# Patient Record
Sex: MALE | Race: BLACK OR AFRICAN AMERICAN | NOT HISPANIC OR LATINO | Employment: OTHER | URBAN - METROPOLITAN AREA
[De-identification: names, ages, dates, MRNs, and addresses within clinical notes are randomized per-mention and may not be internally consistent; named-entity substitution may affect disease eponyms.]

---

## 2024-06-20 PROBLEM — F79 INTELLECTUAL DISABILITY: Status: ACTIVE | Noted: 2024-06-20

## 2024-06-20 PROBLEM — H90.0 CONDUCTIVE HEARING LOSS, BILATERAL: Status: ACTIVE | Noted: 2024-06-20

## 2024-06-20 PROBLEM — Z86.16 HISTORY OF COVID-19: Status: ACTIVE | Noted: 2024-06-20

## 2024-06-20 PROBLEM — H93.293 ABNORMAL AUDITORY PERCEPTION OF BOTH EARS: Status: ACTIVE | Noted: 2024-06-20

## 2024-06-20 PROBLEM — H61.23 BILATERAL IMPACTED CERUMEN: Status: ACTIVE | Noted: 2024-06-20

## 2024-06-20 RX ORDER — ACETAMINOPHEN 500 MG
500 TABLET ORAL EVERY 4 HOURS PRN
COMMUNITY
Start: 2017-05-04

## 2024-06-20 RX ORDER — PETROLATUM,WHITE 41 %
OINTMENT (GRAM) TOPICAL
COMMUNITY
Start: 2017-05-04

## 2024-06-20 RX ORDER — FERROUS SULFATE 325(65) MG
325 TABLET ORAL
COMMUNITY
Start: 2015-04-14

## 2024-06-20 RX ORDER — OXYBUTYNIN CHLORIDE 5 MG/1
5 TABLET ORAL
COMMUNITY
Start: 2017-05-04

## 2024-06-20 RX ORDER — HYDROCHLOROTHIAZIDE 25 MG/1
25 TABLET ORAL DAILY
COMMUNITY
Start: 2017-05-04

## 2024-06-20 RX ORDER — LORAZEPAM 1 MG/1
1 TABLET ORAL EVERY 6 HOURS PRN
COMMUNITY
Start: 2015-04-14

## 2024-06-20 RX ORDER — OMEPRAZOLE 40 MG/1
20 CAPSULE, DELAYED RELEASE ORAL
COMMUNITY
Start: 2015-06-02

## 2024-06-20 RX ORDER — POLYETHYLENE GLYCOL 3350 17 G/17G
17 POWDER, FOR SOLUTION ORAL DAILY
COMMUNITY
Start: 2015-02-10

## 2024-06-20 RX ORDER — QUETIAPINE FUMARATE 400 MG/1
400 TABLET, FILM COATED ORAL NIGHTLY
COMMUNITY
Start: 2015-04-14

## 2024-06-24 NOTE — PROGRESS NOTES
20-May-2019 14:51 21-May-2019 12:35 24-May-2019 10:08 Subjective   Patient ID: Fernando Acevedo is a 44 y.o. male who presents for No chief complaint on file..  HPI  This 44-year-old male with developmental delays being seen today in follow-up.  He had been on a 6-month checkup schedule because of cerumen buildup however he has not been seen since March 2023.  There is been no indication of pain by his demeanor and has had no difficulties with ear drainage.      Objective   Physical Exam  Examination today revealed a 38-year-old male with cognitive and behavioral limitations who does look his stated age.     Ear exam AND PROCEDURE NOTE--on the right-hand side there was no evidence for external skin lesions. The ear canal was obstructed by firm cerumen. This was able to be removed with a wax loop otoscopically, there did not appear to be any signs of swelling or infection of the skin. There appeared to be some deep cerumen near the tympanic membrane which could not be removed. On the left-hand side there was similar findings although there appeared to be somewhat less buildup deeply in the canal and cerumen was able to be removed with a normal tympanic membrane seen.      Nasal-- intranasally there is no sign of purulent discharge or bleeding.     Oral cavity pharyngeal exam unable to visualize due to cooperation     Neck exam-- no palpable adenopathy was noted.  .   Assessment/Plan   Problem List Items Addressed This Visit             ICD-10-CM    Abnormal auditory perception of both ears H93.293    Bilateral impacted cerumen - Primary H61.23    Intellectual disability F79     I discussed the clinical findings with the patient.  He did undergo debridement of his air within the realm of his cooperation.  No signs of infection or inflammation were noted.  A moderate amount of cerumen was relabeled to be removed from both ears more difficult to do on the left due to his cooperation.  He will stay on a 6-month checkup schedule however if there is any pain drainage they need to  contact the office and if he has any issues requiring any additional cleaning that cannot be done in the office setting he have to be referred to a provider who works in a hospital setting for debridement under anesthesia.  Recheck in 6 months will be arranged for.       José Miguel Benz DMD, MD 06/24/24 11:46 AM    19-May-2019 12:29 18-May-2019 12:18 22-May-2019 14:05 23-May-2019 10:01

## 2024-06-27 ENCOUNTER — APPOINTMENT (OUTPATIENT)
Dept: OTOLARYNGOLOGY | Facility: CLINIC | Age: 44
End: 2024-06-27
Payer: MEDICAID

## 2024-06-27 VITALS — WEIGHT: 179.8 LBS | HEIGHT: 62 IN | BODY MASS INDEX: 33.09 KG/M2

## 2024-06-27 DIAGNOSIS — H93.293 ABNORMAL AUDITORY PERCEPTION OF BOTH EARS: ICD-10-CM

## 2024-06-27 DIAGNOSIS — H61.23 BILATERAL IMPACTED CERUMEN: Primary | ICD-10-CM

## 2024-06-27 DIAGNOSIS — F79 INTELLECTUAL DISABILITY: ICD-10-CM

## 2024-06-27 PROBLEM — L72.3 SEBACEOUS CYST: Status: ACTIVE | Noted: 2018-02-01

## 2024-06-27 PROBLEM — L73.2 AXILLARY HIDRADENITIS SUPPURATIVA: Status: ACTIVE | Noted: 2018-02-01

## 2024-06-27 PROBLEM — R79.89 ELEVATED TROPONIN: Status: ACTIVE | Noted: 2021-01-06

## 2024-06-27 PROBLEM — I10 HYPERTENSION: Status: ACTIVE | Noted: 2023-08-08

## 2024-06-27 PROBLEM — R41.0 DELIRIUM: Status: ACTIVE | Noted: 2021-01-08

## 2024-06-27 PROBLEM — I51.9 SYSTOLIC DYSFUNCTION: Status: ACTIVE | Noted: 2021-03-01

## 2024-06-27 PROBLEM — J18.9 PNA (PNEUMONIA): Status: ACTIVE | Noted: 2021-01-06

## 2024-06-27 PROBLEM — E66.9 OBESITY, CLASS I, BMI 30-34.9: Status: ACTIVE | Noted: 2018-04-11

## 2024-06-27 PROBLEM — E66.811 OBESITY, CLASS I, BMI 30-34.9: Status: ACTIVE | Noted: 2018-04-11

## 2024-06-27 PROBLEM — U07.1 COVID-19 VIRUS INFECTION: Status: ACTIVE | Noted: 2021-01-06

## 2024-06-27 PROCEDURE — 99213 OFFICE O/P EST LOW 20 MIN: CPT | Performed by: OTOLARYNGOLOGY

## 2024-06-27 PROCEDURE — 1036F TOBACCO NON-USER: CPT | Performed by: OTOLARYNGOLOGY

## 2024-06-27 RX ORDER — METOPROLOL TARTRATE 25 MG/1
25 TABLET, FILM COATED ORAL DAILY
COMMUNITY
Start: 2024-01-23

## 2024-06-27 RX ORDER — BENZOYL PEROXIDE 100 MG/ML
LIQUID TOPICAL
COMMUNITY
Start: 2024-05-30

## 2024-06-27 RX ORDER — ASPIRIN 81 MG/1
81 TABLET ORAL DAILY
COMMUNITY
Start: 2024-06-09

## 2024-06-27 RX ORDER — CLINDAMYCIN PHOSPHATE 10 UG/ML
LOTION TOPICAL
COMMUNITY
Start: 2023-12-14

## 2024-06-27 RX ORDER — RISPERIDONE 1 MG/1
1 TABLET ORAL DAILY
COMMUNITY

## 2024-06-27 RX ORDER — ASPIRIN 81 MG
5 TABLET, DELAYED RELEASE (ENTERIC COATED) ORAL EVERY 12 HOURS
COMMUNITY

## 2024-06-27 RX ORDER — DIVALPROEX SODIUM 250 MG/1
250 TABLET, FILM COATED, EXTENDED RELEASE ORAL DAILY
COMMUNITY
Start: 2024-06-09

## 2024-06-27 RX ORDER — LEVOTHYROXINE SODIUM 125 UG/1
125 TABLET ORAL DAILY
COMMUNITY
Start: 2024-06-09

## 2024-06-27 RX ORDER — LISINOPRIL 2.5 MG/1
5 TABLET ORAL DAILY
COMMUNITY

## 2024-12-31 ENCOUNTER — APPOINTMENT (OUTPATIENT)
Dept: OTOLARYNGOLOGY | Facility: CLINIC | Age: 44
End: 2024-12-31
Payer: MEDICAID

## 2025-02-13 NOTE — PROGRESS NOTES
Subjective   Patient ID: Fernando Acevedo is a 44 y.o. male who presents for No chief complaint on file..  HPI  This 44-year-old male with developmental delays being seen today in follow-up. He had been on a 6-month checkup schedule because of cerumen buildup however he has not been seen since March 2023. There is been no indication of pain by his demeanor and has had no difficulties with ear drainage   Review of Systems   A 12 point ROS  has been reviewed and are negative for complaint except for what is stated in the history of present illness and /or for past medical history as noted in the EMR.    Past Medical History:   Diagnosis Date    Autistic disorder ([facility])     History of autism spectrum disorder    Personal history of other diseases of the digestive system     History of hiatal hernia    Personal history of other mental and behavioral disorders     History of mental retardation    Unspecified convulsions ([facility])     Seizures          Current Outpatient Medications:     acetaminophen (Tylenol) 500 mg tablet, Take 1 tablet (500 mg) by mouth every 4 hours if needed., Disp: , Rfl:     aspirin 81 mg EC tablet, Take 1 tablet (81 mg) by mouth once daily., Disp: , Rfl:     benzoyl peroxide (Benzac AC) 10 % external wash, , Disp: , Rfl:     clindamycin (Cleocin T) 1 % lotion, Apply to affected skin on underarms twice a day when flaring, Disp: , Rfl:     Debrox 6.5 % otic solution, 5 drops every 12 hours., Disp: , Rfl:     divalproex (Depakote ER) 250 mg 24 hr tablet, Take 1 tablet (250 mg) by mouth once daily., Disp: , Rfl:     ferrous sulfate, 325 mg ferrous sulfate, tablet, Take 1 tablet by mouth once daily in the morning. Take before meals., Disp: , Rfl:     hydroCHLOROthiazide (HYDRODiuril) 25 mg tablet, Take 1 tablet (25 mg) by mouth once daily., Disp: , Rfl:     levothyroxine (Synthroid, Levoxyl) 125 mcg tablet, Take 1 tablet (125 mcg) by mouth early in the morning.., Disp: , Rfl:     lisinopril 2.5 mg tablet,  Take 2 tablets (5 mg) by mouth once daily., Disp: , Rfl:     LORazepam (Ativan) 1 mg tablet, Take 1 tablet (1 mg) by mouth every 6 hours if needed., Disp: , Rfl:     metoprolol tartrate (Lopressor) 25 mg tablet, Take 1 tablet (25 mg) by mouth once daily., Disp: , Rfl:     omeprazole (PriLOSEC) 40 mg DR capsule, Take 20 mg by mouth once daily in the morning. Take before meals., Disp: , Rfl:     oxybutynin (Ditropan) 5 mg tablet, Take 1 tablet (5 mg) by mouth., Disp: , Rfl:     polyethylene glycol (Miralax) 17 gram/dose powder, Take 17 g by mouth once daily., Disp: , Rfl:     QUEtiapine (SEROquel) 400 mg tablet, Take 1 tablet (400 mg) by mouth once daily at bedtime., Disp: , Rfl:     risperiDONE (RisperDAL) 1 mg tablet, Take 1 tablet (1 mg) by mouth once daily., Disp: , Rfl:     white petrolatum (Aquaphor Healing) 41 % ointment ointment, Aquaphor External Ointment  Refills: 0      Start : 4-May-2017  Active, Disp: , Rfl:      Allergies   Allergen Reactions    Ketamine Unknown       There were no vitals taken for this visit.    Objective   Physical Exam  Examination today revealed a 38-year-old male with cognitive and behavioral limitations who does look his stated age.     Ear exam AND PROCEDURE NOTE--on the right-hand side there was no evidence for external skin lesions. The ear canal was obstructed by firm cerumen. This was able to be removed with a wax loop otoscopically, there did not appear to be any signs of swelling or infection of the skin. There appeared to be some deep cerumen near the tympanic membrane which could not be removed. On the left-hand side there was similar findings although there appeared to be somewhat less buildup deeply in the canal and cerumen was able to be removed with a normal tympanic membrane seen.      Nasal-- intranasally there is no sign of purulent discharge or bleeding.     Oral cavity pharyngeal exam unable to visualize due to cooperation     Neck exam-- no palpable adenopathy  was noted.  Assessment/Plan   {Assess/PlanSmartLinks:14583}         José Miguel Benz DMD, MD 02/13/25 10:01 AM

## 2025-02-17 ENCOUNTER — APPOINTMENT (OUTPATIENT)
Dept: OTOLARYNGOLOGY | Facility: CLINIC | Age: 45
End: 2025-02-17
Payer: MEDICAID

## 2025-02-17 DIAGNOSIS — H61.23 BILATERAL IMPACTED CERUMEN: Primary | ICD-10-CM

## 2025-02-17 DIAGNOSIS — H93.293 ABNORMAL AUDITORY PERCEPTION OF BOTH EARS: ICD-10-CM

## 2025-02-17 DIAGNOSIS — F79 INTELLECTUAL DISABILITY: ICD-10-CM

## 2025-02-21 NOTE — PROGRESS NOTES
Subjective   Patient ID: Frenando Acevedo is a 44 y.o. male who presents for No chief complaint on file..  HPI  This 44-year-old male with developmental delays being seen today in follow-up. He had been on a 6-month checkup schedule because of cerumen buildup however he has not been seen since March 2023. There is been no indication of pain by his demeanor and has had no difficulties with ear drainage   Review of Systems    Objective   Physical Exam  Physical Exam  Examination today revealed a 38-year-old male with cognitive and behavioral limitations who does look his stated age.     Ear exam AND PROCEDURE NOTE--on the right-hand side there was no evidence for external skin lesions. The ear canal was obstructed by firm cerumen. This was able to be removed with a wax loop otoscopically, there did not appear to be any signs of swelling or infection of the skin. There appeared to be some deep cerumen near the tympanic membrane which could not be removed. On the left-hand side there was similar findings although there appeared to be somewhat less buildup deeply in the canal and cerumen was able to be removed with a normal tympanic membrane seen.      Nasal-- intranasally there is no sign of purulent discharge or bleeding.     Oral cavity pharyngeal exam unable to visualize due to cooperation     Neck exam-- no palpable adenopathy was noted.     Assessment/Plan   {Assess/PlanSmartLinks:35364}         José Miguel Benz DMD, MD 02/21/25 12:21 PM

## 2025-02-25 ENCOUNTER — APPOINTMENT (OUTPATIENT)
Dept: OTOLARYNGOLOGY | Facility: CLINIC | Age: 45
End: 2025-02-25
Payer: MEDICAID

## 2025-02-25 DIAGNOSIS — H61.23 BILATERAL IMPACTED CERUMEN: Primary | ICD-10-CM

## 2025-02-25 DIAGNOSIS — F79 INTELLECTUAL DISABILITY: ICD-10-CM

## 2025-02-25 DIAGNOSIS — H93.293 ABNORMAL AUDITORY PERCEPTION OF BOTH EARS: ICD-10-CM

## 2025-02-27 NOTE — PROGRESS NOTES
Subjective   Patient ID: Fernando Acevedo is a 44 y.o. male who presents for No chief complaint on file..  HPI  This 44-year-old male with developmental delays being seen today in follow-up. He had been on a 6-month checkup schedule because of cerumen buildup however he has not been seen since March 2023. There is been no indication of pain by his demeanor and has had no difficulties with ear drainage   Review of Systems   A 12 point ROS  has been reviewed and are negative for complaint except for what is stated in the history of present illness and /or for past medical history as noted in the EMR.    Objective   Physical Exam  Examination today revealed a 38-year-old male with cognitive and behavioral limitations who does look his stated age.     Ear exam AND PROCEDURE NOTE--on the right-hand side there was no evidence for external skin lesions. The ear canal was obstructed by firm cerumen. This was able to be removed with a wax loop otoscopically, there did not appear to be any signs of swelling or infection of the skin. There appeared to be some deep cerumen near the tympanic membrane which could not be removed. On the left-hand side there was similar findings although there appeared to be somewhat less buildup deeply in the canal and cerumen was able to be removed with a normal tympanic membrane seen.      Nasal-- intranasally there is no sign of purulent discharge or bleeding.     Oral cavity pharyngeal exam unable to visualize due to cooperation     Neck exam-- no palpable adenopathy was noted.  Assessment/Plan   {Assess/PlanSmartLinks:05952}         José Miguel Benz DMD, MD 02/27/25 1:45 PM

## 2025-02-28 NOTE — PROGRESS NOTES
Subjective   Patient ID: Fernando Acevedo is a 44 y.o. male who presents for No chief complaint on file..  HPI  This 44-year-old male with developmental delays being seen today in follow-up. He had been on a 6-month checkup schedule because of cerumen buildup however he has not been seen since March 2023. There is been no indication of pain by his demeanor and has had no difficulties with ear drainage   Review of Systems   A 12 point ROS  has been reviewed and are negative for complaint except for what is stated in the history of present illness and /or for past medical history as noted in the EMR.    Objective   Physical Exam  Examination today revealed a 38-year-old male with cognitive and behavioral limitations who does look his stated age.     Ear exam AND PROCEDURE NOTE--on the right-hand side there was no evidence for external skin lesions. The ear canal was obstructed by firm cerumen. This was able to be removed with a wax loop otoscopically, there did not appear to be any signs of swelling or infection of the skin. There appeared to be some deep cerumen near the tympanic membrane which could not be removed. On the left-hand side there was similar findings although there appeared to be somewhat less buildup deeply in the canal and cerumen was able to be removed with a normal tympanic membrane seen.      Nasal-- intranasally there is no sign of purulent discharge or bleeding.     Oral cavity pharyngeal exam unable to visualize due to cooperation     Neck exam-- no palpable adenopathy was noted.     Assessment/Plan   {Assess/PlanSmartLinks:62990}         José Miguel Benz DMD, MD 02/28/25 11:55 AM

## 2025-03-03 ENCOUNTER — APPOINTMENT (OUTPATIENT)
Dept: OTOLARYNGOLOGY | Facility: CLINIC | Age: 45
End: 2025-03-03
Payer: MEDICAID

## 2025-03-03 DIAGNOSIS — F79 INTELLECTUAL DISABILITY: Primary | ICD-10-CM

## 2025-03-03 DIAGNOSIS — H61.23 BILATERAL IMPACTED CERUMEN: ICD-10-CM

## 2025-03-04 ENCOUNTER — APPOINTMENT (OUTPATIENT)
Dept: OTOLARYNGOLOGY | Facility: CLINIC | Age: 45
End: 2025-03-04
Payer: MEDICAID

## 2025-03-04 DIAGNOSIS — H61.23 BILATERAL IMPACTED CERUMEN: ICD-10-CM

## 2025-03-04 DIAGNOSIS — F79 INTELLECTUAL DISABILITY: Primary | ICD-10-CM

## 2025-03-04 DIAGNOSIS — H93.293 ABNORMAL AUDITORY PERCEPTION OF BOTH EARS: ICD-10-CM
